# Patient Record
Sex: MALE | Race: WHITE | NOT HISPANIC OR LATINO | URBAN - METROPOLITAN AREA
[De-identification: names, ages, dates, MRNs, and addresses within clinical notes are randomized per-mention and may not be internally consistent; named-entity substitution may affect disease eponyms.]

---

## 2019-07-26 ENCOUNTER — INPATIENT (INPATIENT)
Facility: HOSPITAL | Age: 59
LOS: 0 days | Discharge: HOME | DRG: 395 | End: 2019-07-26
Attending: INTERNAL MEDICINE | Admitting: INTERNAL MEDICINE
Payer: COMMERCIAL

## 2019-07-26 VITALS
DIASTOLIC BLOOD PRESSURE: 90 MMHG | RESPIRATION RATE: 20 BRPM | HEART RATE: 73 BPM | SYSTOLIC BLOOD PRESSURE: 143 MMHG | OXYGEN SATURATION: 98 % | TEMPERATURE: 97 F

## 2019-07-26 VITALS
SYSTOLIC BLOOD PRESSURE: 126 MMHG | WEIGHT: 177.91 LBS | OXYGEN SATURATION: 97 % | DIASTOLIC BLOOD PRESSURE: 90 MMHG | HEIGHT: 67 IN | RESPIRATION RATE: 18 BRPM | HEART RATE: 83 BPM | TEMPERATURE: 98 F

## 2019-07-26 DIAGNOSIS — Z98.890 OTHER SPECIFIED POSTPROCEDURAL STATES: Chronic | ICD-10-CM

## 2019-07-26 DIAGNOSIS — I10 ESSENTIAL (PRIMARY) HYPERTENSION: ICD-10-CM

## 2019-07-26 DIAGNOSIS — T18.108D UNSPECIFIED FOREIGN BODY IN ESOPHAGUS CAUSING OTHER INJURY, SUBSEQUENT ENCOUNTER: ICD-10-CM

## 2019-07-26 LAB
ALBUMIN SERPL ELPH-MCNC: 4.4 G/DL — SIGNIFICANT CHANGE UP (ref 3.3–5.2)
ALP SERPL-CCNC: 59 U/L — SIGNIFICANT CHANGE UP (ref 40–120)
ALT FLD-CCNC: 39 U/L — SIGNIFICANT CHANGE UP
ANION GAP SERPL CALC-SCNC: 13 MMOL/L — SIGNIFICANT CHANGE UP (ref 5–17)
APTT BLD: 41.5 SEC — HIGH (ref 27.5–36.3)
AST SERPL-CCNC: 22 U/L — SIGNIFICANT CHANGE UP
BASOPHILS # BLD AUTO: 0.06 K/UL — SIGNIFICANT CHANGE UP (ref 0–0.2)
BASOPHILS NFR BLD AUTO: 0.7 % — SIGNIFICANT CHANGE UP (ref 0–2)
BILIRUB SERPL-MCNC: 0.5 MG/DL — SIGNIFICANT CHANGE UP (ref 0.4–2)
BLD GP AB SCN SERPL QL: SIGNIFICANT CHANGE UP
BUN SERPL-MCNC: 11 MG/DL — SIGNIFICANT CHANGE UP (ref 8–20)
CALCIUM SERPL-MCNC: 9 MG/DL — SIGNIFICANT CHANGE UP (ref 8.6–10.2)
CHLORIDE SERPL-SCNC: 102 MMOL/L — SIGNIFICANT CHANGE UP (ref 98–107)
CO2 SERPL-SCNC: 28 MMOL/L — SIGNIFICANT CHANGE UP (ref 22–29)
CREAT SERPL-MCNC: 0.58 MG/DL — SIGNIFICANT CHANGE UP (ref 0.5–1.3)
EOSINOPHIL # BLD AUTO: 0.3 K/UL — SIGNIFICANT CHANGE UP (ref 0–0.5)
EOSINOPHIL NFR BLD AUTO: 3.3 % — SIGNIFICANT CHANGE UP (ref 0–6)
GLUCOSE SERPL-MCNC: 99 MG/DL — SIGNIFICANT CHANGE UP (ref 70–115)
HCT VFR BLD CALC: 46 % — SIGNIFICANT CHANGE UP (ref 39–50)
HGB BLD-MCNC: 14.9 G/DL — SIGNIFICANT CHANGE UP (ref 13–17)
IMM GRANULOCYTES NFR BLD AUTO: 0.3 % — SIGNIFICANT CHANGE UP (ref 0–1.5)
INR BLD: 1.33 RATIO — HIGH (ref 0.88–1.16)
LYMPHOCYTES # BLD AUTO: 1.65 K/UL — SIGNIFICANT CHANGE UP (ref 1–3.3)
LYMPHOCYTES # BLD AUTO: 18.1 % — SIGNIFICANT CHANGE UP (ref 13–44)
MCHC RBC-ENTMCNC: 27.5 PG — SIGNIFICANT CHANGE UP (ref 27–34)
MCHC RBC-ENTMCNC: 32.4 GM/DL — SIGNIFICANT CHANGE UP (ref 32–36)
MCV RBC AUTO: 85 FL — SIGNIFICANT CHANGE UP (ref 80–100)
MONOCYTES # BLD AUTO: 0.63 K/UL — SIGNIFICANT CHANGE UP (ref 0–0.9)
MONOCYTES NFR BLD AUTO: 6.9 % — SIGNIFICANT CHANGE UP (ref 2–14)
NEUTROPHILS # BLD AUTO: 6.45 K/UL — SIGNIFICANT CHANGE UP (ref 1.8–7.4)
NEUTROPHILS NFR BLD AUTO: 70.7 % — SIGNIFICANT CHANGE UP (ref 43–77)
PLATELET # BLD AUTO: 300 K/UL — SIGNIFICANT CHANGE UP (ref 150–400)
POTASSIUM SERPL-MCNC: 3.4 MMOL/L — LOW (ref 3.5–5.3)
POTASSIUM SERPL-SCNC: 3.4 MMOL/L — LOW (ref 3.5–5.3)
PROT SERPL-MCNC: 7.7 G/DL — SIGNIFICANT CHANGE UP (ref 6.6–8.7)
PROTHROM AB SERPL-ACNC: 15.4 SEC — HIGH (ref 10–12.9)
RBC # BLD: 5.41 M/UL — SIGNIFICANT CHANGE UP (ref 4.2–5.8)
RBC # FLD: 13.2 % — SIGNIFICANT CHANGE UP (ref 10.3–14.5)
SODIUM SERPL-SCNC: 143 MMOL/L — SIGNIFICANT CHANGE UP (ref 135–145)
WBC # BLD: 9.12 K/UL — SIGNIFICANT CHANGE UP (ref 3.8–10.5)
WBC # FLD AUTO: 9.12 K/UL — SIGNIFICANT CHANGE UP (ref 3.8–10.5)

## 2019-07-26 PROCEDURE — 86850 RBC ANTIBODY SCREEN: CPT

## 2019-07-26 PROCEDURE — 99285 EMERGENCY DEPT VISIT HI MDM: CPT | Mod: 25

## 2019-07-26 PROCEDURE — 99223 1ST HOSP IP/OBS HIGH 75: CPT

## 2019-07-26 PROCEDURE — 85610 PROTHROMBIN TIME: CPT

## 2019-07-26 PROCEDURE — 85027 COMPLETE CBC AUTOMATED: CPT

## 2019-07-26 PROCEDURE — 85730 THROMBOPLASTIN TIME PARTIAL: CPT

## 2019-07-26 PROCEDURE — 71046 X-RAY EXAM CHEST 2 VIEWS: CPT | Mod: 26

## 2019-07-26 PROCEDURE — 80053 COMPREHEN METABOLIC PANEL: CPT

## 2019-07-26 PROCEDURE — 86901 BLOOD TYPING SEROLOGIC RH(D): CPT

## 2019-07-26 PROCEDURE — 36415 COLL VENOUS BLD VENIPUNCTURE: CPT

## 2019-07-26 PROCEDURE — 71046 X-RAY EXAM CHEST 2 VIEWS: CPT

## 2019-07-26 PROCEDURE — 86900 BLOOD TYPING SEROLOGIC ABO: CPT

## 2019-07-26 PROCEDURE — 99284 EMERGENCY DEPT VISIT MOD MDM: CPT

## 2019-07-26 PROCEDURE — 96374 THER/PROPH/DIAG INJ IV PUSH: CPT

## 2019-07-26 RX ORDER — PANTOPRAZOLE SODIUM 20 MG/1
40 TABLET, DELAYED RELEASE ORAL
Refills: 0 | Status: DISCONTINUED | OUTPATIENT
Start: 2019-07-26 | End: 2019-07-26

## 2019-07-26 RX ORDER — AMLODIPINE BESYLATE 2.5 MG/1
1 TABLET ORAL
Qty: 0 | Refills: 0 | DISCHARGE

## 2019-07-26 RX ORDER — GLUCAGON INJECTION, SOLUTION 0.5 MG/.1ML
1 INJECTION, SOLUTION SUBCUTANEOUS ONCE
Refills: 0 | Status: COMPLETED | OUTPATIENT
Start: 2019-07-26 | End: 2019-07-26

## 2019-07-26 RX ORDER — HYDROCHLOROTHIAZIDE 25 MG
12.5 TABLET ORAL DAILY
Refills: 0 | Status: DISCONTINUED | OUTPATIENT
Start: 2019-07-26 | End: 2019-07-26

## 2019-07-26 RX ORDER — AMLODIPINE BESYLATE 2.5 MG/1
10 TABLET ORAL DAILY
Refills: 0 | Status: DISCONTINUED | OUTPATIENT
Start: 2019-07-26 | End: 2019-07-26

## 2019-07-26 RX ORDER — POTASSIUM CHLORIDE 20 MEQ
10 PACKET (EA) ORAL
Refills: 0 | Status: DISCONTINUED | OUTPATIENT
Start: 2019-07-26 | End: 2019-07-26

## 2019-07-26 RX ADMIN — GLUCAGON INJECTION, SOLUTION 1 MILLIGRAM(S): 0.5 INJECTION, SOLUTION SUBCUTANEOUS at 09:52

## 2019-07-26 NOTE — PROGRESS NOTE ADULT - SUBJECTIVE AND OBJECTIVE BOX
GI addendum:  Pt was brought to Endo suite for planned EGD.  Procedure never done as Pt suddenly felt completely well and low chest discomfort resolved.  No difficulty with secretions.  Looked well.  Was able to swallow 8 ounces of water without development of any symptoms.    EGD therefore cancelled and pt was returned to ED for discharge instructions.  Advised GI office follow up on prn basis.    IMP:  resolved food impaction of the esophagus.

## 2019-07-26 NOTE — ED PROVIDER NOTE - ATTENDING CONTRIBUTION TO CARE
57 yo male pmh htn,  comes to ed with 12  hour history of difficulty swallowing after eating steak; pt with p 2 previous food impaction with endoscopy; ; pt noted drinking water with immediate vomiting;  pe  awake in nad heent ncat; neck supple kenton s1 s2 abd soft neuro nonfocal;  food impaction

## 2019-07-26 NOTE — ED STATDOCS - PROGRESS NOTE DETAILS
59 y/o M pt presents to the ED c/o food stuck in his throat. Pt says that he was eating last night while working and forgot to chew a piece of meat that he now thinks is stuck in his throat somewhere around his upper chest. He is unable to swallow his saliva. He spent all night trying to induce himself to vomit using cold and hot water and seltzer with no relief. This has happened to him once more in the past about 8 months ago for which he had an endoscopy done  . No further acute complaints at this time. 59 y/o M pt presents to the ED c/o food stuck in his throat. Pt says that he was eating last night while working and forgot to chew a piece of meat that he now thinks is stuck in his throat somewhere around his upper chest. He is unable to swallow his saliva. He spent all night trying to induce himself to vomit using cold and hot water and seltzer with no relief. This has happened to him twice more in the past about 8 months ago for which he had an endoscopy done. Denies SOB.  Exam: normal respiration, normal voice  Pt will be sent to the Main ED for further treatment and evaluation. Initial orders placed.

## 2019-07-26 NOTE — DISCHARGE NOTE PROVIDER - CARE PROVIDER_API CALL
Kelsey Otto (DO)  Gastroenterology  [address], [address]  [place] [address]  Phone: [phone]  Fax: [phone]  Follow Up Time:

## 2019-07-26 NOTE — ED ADULT TRIAGE NOTE - CHIEF COMPLAINT QUOTE
pt stated " I ate a piece of meat last night around 9pm and its stuck, I cant feel it." pt reports he tried to drink water and is vomtijng anything he drinks. Pt also stated " I can swallow my saliva, but after a while of it building up I throw up" Reports this has happened 2 times before and he needed an endoscopy. pt stated " I ate a piece of meat last night around 9pm and its stuck, I cant feel it." pt reports he tried to drink water and is vomiting anything he drinks. Pt also stated " I can swallow my saliva, but after a while of it building up I throw up" Reports this has happened 2 times before and he needed an endoscopy. Denies SOB denies chest pains.

## 2019-07-26 NOTE — DISCHARGE NOTE PROVIDER - NSDCFUADDINST_GEN_ALL_CORE_FT
please call your GI and Primary Md for appointment. bring all meds and discharge papers to all health care visits. return if symptoms recur or any new concerning symptoms.

## 2019-07-26 NOTE — ED ADULT NURSE NOTE - OBJECTIVE STATEMENT
pt reports he was eating a piece of meat last night around 9pm when he felt it get stuck reports similar episodes in the past one passed on it own and one required endo intervention. pt speaking full sentences, NARD, no drooling, only spitting noted. denies any other complaints at this time,

## 2019-07-26 NOTE — H&P ADULT - NSHPREVIEWOFSYSTEMS_GEN_ALL_CORE
CONSTITUTIONAL:  No distress. no fever/chills/fatigue/weight loss  HEENT:  Eyes:  No diplopia or blurred vision.   CARDIOVASCULAR:  No pressure, squeezing, tightness, heaviness or aching about the chest; no palpitations. no leg swelling, no orthopnea or PND  RESPIRATORY:  no SOB. no wheezing. no cough or sputum.  No hemoptysis  GI: no nausea, no vomiting, no diarrhea, no constipation. No hematochezia or melena  EXT:No joint pain or joint swelling or redness  SKIN: no skin break or ulcer. No cellulitis.   CNS: No headaches. No weakness. no numbness. No depression or anxiety. No SI

## 2019-07-26 NOTE — ED PROVIDER NOTE - CLINICAL SUMMARY MEDICAL DECISION MAKING FREE TEXT BOX
Patient was given a trial of glucagon and failed treatment. GI was contacted and took the patient to EGD and will be admitted to their service.

## 2019-07-26 NOTE — H&P ADULT - HISTORY OF PRESENT ILLNESS
: 57 y/o M pt presents to the ED c/o food stuck in his throat. Pt says that he was eating last night while working and forgot to chew a piece of meat that he now thinks is stuck in his throat somewhere around his upper chest. He is unable to swallow his saliva. He spent all night trying to induce himself to vomit using cold and hot water and seltzer with no relief. This has happened to him twice more in the past about 8 months ago for which he had an endoscopy done. Denies SOB."    patient was seen in the endoscopy waiting area. above HPi reviewed with patient. patient endorses the same. still feeling like something stuck in his chest constantly. no other symptoms. awaiting EGD. seen by GI. wants to go home before 5 pm if possible. patient has no PMHx

## 2019-07-26 NOTE — DISCHARGE NOTE PROVIDER - HOSPITAL COURSE
58 yrs old man with h/o HTN, multiple food impaction in the past, s/p EGD, came with food impaction last night not relieved with induced vomiting        a/p:     food impaction    admit to medical bed    NPO for EGd    protonix daily    patient wants to go home after endoscopy     GI on board        HTN: c/w home meds. DASh/TLc diet        hypokalemia: replacement ordered

## 2019-07-26 NOTE — CHART NOTE - NSCHARTNOTEFT_GEN_A_CORE
patient was admitted by me for food impaction. awaiting for EGD. saw on endo suit. however as per GI, patient was feeling better. was able to get water downwithout problem. symptoms resolved. GI sent patient to ER and patient was discharged without my knowledge and however under my name.

## 2019-07-26 NOTE — ED ADULT NURSE NOTE - CHIEF COMPLAINT QUOTE
pt stated " I ate a piece of meat last night around 9pm and its stuck, I cant feel it." pt reports he tried to drink water and is vomiting anything he drinks. Pt also stated " I can swallow my saliva, but after a while of it building up I throw up" Reports this has happened 2 times before and he needed an endoscopy. Denies SOB denies chest pains.

## 2019-07-26 NOTE — H&P ADULT - NSHPLABSRESULTS_GEN_ALL_CORE
14.9   9.12  )-----------( 300      ( 26 Jul 2019 09:52 )             46.0       07-26    143  |  102  |  11.0  ----------------------------<  99  3.4<L>   |  28.0  |  0.58    Ca    9.0      26 Jul 2019 09:52    TPro  7.7  /  Alb  4.4  /  TBili  0.5  /  DBili  x   /  AST  22  /  ALT  39  /  AlkPhos  59  07-26

## 2019-07-26 NOTE — ED PROVIDER NOTE - NS ED ROS FT
Const: Denies fever, chills  HEENT: Denies neck pain, sore throat  Resp: Denies coughing, SOB  Cardiovascular: Denies CP, palpitations, endorses sensation of food bolus in upper chest  GI: Denies nausea, abdominal pain, diarrhea, constipation, blood in stool, reports vomiting  MSK: Denies muscle pain

## 2019-07-26 NOTE — DISCHARGE NOTE PROVIDER - NSDCCPCAREPLAN_GEN_ALL_CORE_FT
PRINCIPAL DISCHARGE DIAGNOSIS  Diagnosis: Foreign body in esophagus, subsequent encounter  Assessment and Plan of Treatment: Foreign body in esophagus, subsequent encounter, s/p EGd, see GI and primary MD      SECONDARY DISCHARGE DIAGNOSES  Diagnosis: Hypertension  Assessment and Plan of Treatment: continue meds. see primary MD

## 2019-07-26 NOTE — ED PROVIDER NOTE - PHYSICAL EXAMINATION
General: Awake alert and oriented  HEENT: Moist mucus membranes, oropharynx clear  Neck: Soft and nontender, full ROM  Cardiac: Regular rate and regular rhythm. +S1/S2. No murmurs. Peripheral pulses 2+ and symmetric. No LE edema.  Resp: Speaking in full sentences. No evidence of respiratory distress. No wheezes, rales or rhonchi.  Abd: Soft, non-tender, non-distended. Normal bowel sounds in all 4 quadrants. No guarding or rebound.

## 2019-07-26 NOTE — CONSULT NOTE ADULT - SUBJECTIVE AND OBJECTIVE BOX
Patient is a 58y old  Male who presents with a chief complaint of     HPI: Patient with hx of htn. Ate steak last night and now feels meat is stuck in mid esophagus. States he is regurgitating water   when attempted to drink . Was initally spitting up secretions. Now during the interview, pt is not spitting up secretions. No heartburn, no regurgitation.  This is his third food impaction.  Required an EGD once ( last year) for the meat impaction.       REVIEW OF SYSTEMS:  Constitutional: No fever, weight loss or fatigue  ENMT:  No difficulty hearing, tinnitus, vertigo; No sinus or throat pain  Respiratory: No cough, wheezing, chills or hemoptysis  Cardiovascular: No chest pain, palpitations, dizziness or leg swelling  Gastrointestinal: No abdominal or epigastric pain. No nausea, vomiting or hematemesis; No diarrhea or constipation. No melena or hematochezia.+ dysphagia  Skin: No itching, burning, rashes or lesions   Musculoskeletal: No joint pain or swelling; No muscle, back or extremity pain    PAST MEDICAL & SURGICAL HISTORY:  orthopedic sx- nose, ankle  lasik  umbilical hernia      FAMILY HISTORY: no colon cancer      SOCIAL HISTORY:  Smoking Status: [ ] Current, [ ] Former, [ ] Never  Pack Years:  [  ] EtOH-no  [  ] IVDA    MEDICATIONS:  MEDICATIONS  (STANDING):    MEDICATIONS  (PRN):      Allergies    enalapril (Swelling)    Intolerances        Vital Signs Last 24 Hrs  T(C): 36.7 (26 Jul 2019 07:12), Max: 36.7 (26 Jul 2019 07:12)  T(F): 98.1 (26 Jul 2019 07:12), Max: 98.1 (26 Jul 2019 07:12)  HR: 83 (26 Jul 2019 07:12) (83 - 83)  BP: 126/90 (26 Jul 2019 07:12) (126/90 - 126/90)  BP(mean): --  RR: 18 (26 Jul 2019 07:12) (18 - 18)  SpO2: 97% (26 Jul 2019 07:12) (97% - 97%)        PHYSICAL EXAM:    General: Well developed; well nourished; in no acute distress  HEENT: MMM, conjunctiva and sclera clear  H- RRR  L- CTA  Gastrointestinal: Soft, non-tender non-distended; Normal bowel sounds; No rebound or guarding  Extremities: Normal range of motion, No clubbing, cyanosis or edema  Neurological: Alert and oriented x3  Skin: Warm and dry. No obvious rash      LABS:                        14.9   9.12  )-----------( 300      ( 26 Jul 2019 09:52 )             46.0     26 Jul 2019 09:52    143    |  102    |  11.0   ----------------------------<  99     3.4     |  28.0   |  0.58     Ca    9.0        26 Jul 2019 09:52    TPro  7.7    /  Alb  4.4    /  TBili  0.5    /  DBili  x      /  AST  22     /  ALT  39     /  AlkPhos  59     / Amylase x      /Lipase x      26 Jul 2019 09:52              RADIOLOGY & ADDITIONAL STUDIES:

## 2019-07-26 NOTE — ED PROVIDER NOTE - OBJECTIVE STATEMENT
Patient states that last night he was eating steak around 9pm and felt a piece get stuck in his esophagus. He reports that this is the third time this has happened. One time self-resolved, one time required endoscopy. He reported vomiting multiple times overnight in an attempt to remove the block. He states he has been unable to eat or drink since. He denies any chest pain, fever, chills, nausea.

## 2019-07-26 NOTE — H&P ADULT - NSHPPHYSICALEXAM_GEN_ALL_CORE
Vital Signs Last 24 Hrs  T(C): 36.7 (26 Jul 2019 07:12), Max: 36.7 (26 Jul 2019 07:12)  T(F): 98.1 (26 Jul 2019 07:12), Max: 98.1 (26 Jul 2019 07:12)  HR: 83 (26 Jul 2019 07:12) (83 - 83)  BP: 126/90 (26 Jul 2019 07:12) (126/90 - 126/90)  BP(mean): --  RR: 18 (26 Jul 2019 07:12) (18 - 18)  SpO2: 97% (26 Jul 2019 07:12) (97% - 97%)    CAPILLARY BLOOD GLUCOSE      GENERAL: Not in distress. Alert    HEENT:  Normocephalic and atraumatic. PEARLA,EOMI  NECK: Supple.  No JVD.    CARDIOVASCULAR: RRR S1, S2. No murmur/rubs/gallop  LUNGS: BLAE+, no rales, no wheezing, no rhonchi.    ABDOMEN: ND. Soft,  NT, no guarding / rebound / rigidity. BS normoactive. No CVA tenderness.    BACK: No spine tenderness.  EXTREMITIES: no cyanosis, no clubbing, no edema.   SKIN: no rash. No skin break or ulcer. No cellulitis.  NEUROLOGIC: AAO*3.strength is symmetric, sensation intact, speech fluent.    PSYCHIATRIC: Calm.  No agitation.